# Patient Record
Sex: MALE | Race: WHITE
[De-identification: names, ages, dates, MRNs, and addresses within clinical notes are randomized per-mention and may not be internally consistent; named-entity substitution may affect disease eponyms.]

---

## 2019-04-18 ENCOUNTER — HOSPITAL ENCOUNTER (OUTPATIENT)
Dept: HOSPITAL 90 - SHCH | Age: 84
Discharge: HOME | End: 2019-04-18
Attending: INTERNAL MEDICINE
Payer: OTHER GOVERNMENT

## 2019-04-18 DIAGNOSIS — I71.4: Primary | ICD-10-CM

## 2019-04-18 PROCEDURE — 93978 VASCULAR STUDY: CPT

## 2019-06-06 ENCOUNTER — HOSPITAL ENCOUNTER (OUTPATIENT)
Dept: HOSPITAL 90 - DAH | Age: 84
Discharge: HOME | End: 2019-06-06
Attending: INTERNAL MEDICINE
Payer: OTHER GOVERNMENT

## 2019-06-06 VITALS — SYSTOLIC BLOOD PRESSURE: 149 MMHG | DIASTOLIC BLOOD PRESSURE: 66 MMHG

## 2019-06-06 VITALS — SYSTOLIC BLOOD PRESSURE: 152 MMHG | DIASTOLIC BLOOD PRESSURE: 80 MMHG

## 2019-06-06 VITALS — DIASTOLIC BLOOD PRESSURE: 71 MMHG | SYSTOLIC BLOOD PRESSURE: 156 MMHG

## 2019-06-06 DIAGNOSIS — K80.20: ICD-10-CM

## 2019-06-06 DIAGNOSIS — Z95.820: ICD-10-CM

## 2019-06-06 DIAGNOSIS — M47.895: ICD-10-CM

## 2019-06-06 DIAGNOSIS — K82.8: ICD-10-CM

## 2019-06-06 DIAGNOSIS — Z93.3: ICD-10-CM

## 2019-06-06 DIAGNOSIS — I71.4: Primary | ICD-10-CM

## 2019-06-06 DIAGNOSIS — K40.20: ICD-10-CM

## 2019-06-06 DIAGNOSIS — I25.10: ICD-10-CM

## 2019-06-06 DIAGNOSIS — N32.89: ICD-10-CM

## 2019-06-06 LAB
BUN SERPL-MCNC: 27 MG/DL (ref 7–18)
CREAT SERPL-MCNC: 1.8 MG/DL (ref 0.5–1.5)
GFR SERPL CREATININE-BSD FRML MDRD: 38 ML/MIN (ref 60–?)

## 2019-06-06 PROCEDURE — 82948 REAGENT STRIP/BLOOD GLUCOSE: CPT

## 2019-06-06 PROCEDURE — 84520 ASSAY OF UREA NITROGEN: CPT

## 2019-06-06 PROCEDURE — 96361 HYDRATE IV INFUSION ADD-ON: CPT

## 2019-06-06 PROCEDURE — 74174 CTA ABD&PLVS W/CONTRAST: CPT

## 2019-06-06 PROCEDURE — 82565 ASSAY OF CREATININE: CPT

## 2019-06-06 PROCEDURE — 36415 COLL VENOUS BLD VENIPUNCTURE: CPT

## 2019-06-06 PROCEDURE — 96360 HYDRATION IV INFUSION INIT: CPT

## 2019-06-06 NOTE — NUR
ILEOSTOMY RIGHT ABDOMEN DRAINING YELLOWISH, LIQUID-SEMI-SOLID STOOLS.

-------------------------------------------------------------------------------

Addendum: 06/06/19 at 1448 by KYLE RODRIGUEZ RN RN

-------------------------------------------------------------------------------

Amended: Links added.

## 2019-06-06 NOTE — NUR
NOTE

PT CAME BACK FROM RADIOLOGY DEPARTMENT AWAKE, ALERT ORIENTED. STABLE. NO COMPLAINTS MADE.  
NOT IN ANY APPARENT DISTRESS. DISCHARGE INSTRUCTIONS GIVEN BY [place] AT THE RADIOLOGY 
DEPARTMENT TO WIFE, ASKED WIFE IF THERE ARE ANY FURTHER QUESTIONS, WIFE VERBALIZED 
UNDERSTANDING TO ALL INSTRUCTIONS. PT DISCHARGED IN STABLE CONDITION VIA WHEELCHAIR WITH 
WIFE.